# Patient Record
Sex: MALE | Race: WHITE | NOT HISPANIC OR LATINO | Employment: STUDENT | URBAN - METROPOLITAN AREA
[De-identification: names, ages, dates, MRNs, and addresses within clinical notes are randomized per-mention and may not be internally consistent; named-entity substitution may affect disease eponyms.]

---
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
## 2022-06-19 DIAGNOSIS — F32.4 MAJOR DEPRESSIVE DISORDER WITH SINGLE EPISODE, IN PARTIAL REMISSION: ICD-10-CM

## 2022-06-19 NOTE — TELEPHONE ENCOUNTER
No new care gaps identified.  [facility] Embedded Care Gaps. Reference number: [id number]. 6/19/2022   8:31:19 AM CDT

## 2022-06-20 RX ORDER — BUPROPION HYDROCHLORIDE 300 MG/1
TABLET ORAL
Qty: 90 TABLET | Refills: 1 | Status: SHIPPED | OUTPATIENT
Start: 2022-06-20 | End: 2023-01-05

## 2022-06-20 NOTE — TELEPHONE ENCOUNTER
Refill Decision Note   Carmine Rodriguez  is requesting a refill authorization.  Brief Assessment and Rationale for Refill:  Approve     Medication Therapy Plan:       Medication Reconciliation Completed: No   Comments:     No Care Gaps recommended.     Note composed:12:32 PM 06/20/2022

## 2023-01-05 DIAGNOSIS — F32.4 MAJOR DEPRESSIVE DISORDER WITH SINGLE EPISODE, IN PARTIAL REMISSION: ICD-10-CM

## 2023-01-05 RX ORDER — BUPROPION HYDROCHLORIDE 300 MG/1
TABLET ORAL
Qty: 90 TABLET | Refills: 0 | Status: SHIPPED | OUTPATIENT
Start: 2023-01-05 | End: 2023-04-17

## 2023-01-05 NOTE — TELEPHONE ENCOUNTER
Care Due:                  Date            Visit Type   Department     Provider  --------------------------------------------------------------------------------                                EP -                              PRIMARY      [facility] FAMILY  Last Visit: 12-      CARE ([facility])   MEDICINE       Anoop May  Next Visit: None Scheduled  None         None Found                                                            Last  Test          Frequency    Reason                     Performed    Due Date  --------------------------------------------------------------------------------    Office Visit  12 months..  buPROPion................  12- 12-    [facility] Embedded Care Gaps. Reference number: [id number]. 1/05/2023   12:17:46 AM CST

## 2023-01-05 NOTE — TELEPHONE ENCOUNTER
Refill Routing Note   Medication(s) are not appropriate for processing by [facility] for the following reason(s):      - Required vitals are outdated    [facility] action(s):  Defer          Medication reconciliation completed: No     Appointments  past 12m or future 3m with PCP    Date Provider   Last Visit   12/6/2021 Anoop May MD   Next Visit   Visit date not found Anoop May MD   ED visits in past 90 days: 0        Note composed:7:55 AM 01/05/2023

## 2023-01-30 ENCOUNTER — OFFICE VISIT (OUTPATIENT)
Dept: URGENT CARE | Facility: CLINIC | Age: 23
End: 2023-01-30
Payer: COMMERCIAL

## 2023-01-30 VITALS
BODY MASS INDEX: 25.67 KG/M2 | TEMPERATURE: 99 F | OXYGEN SATURATION: 99 % | SYSTOLIC BLOOD PRESSURE: 128 MMHG | DIASTOLIC BLOOD PRESSURE: 87 MMHG | WEIGHT: 200 LBS | HEART RATE: 71 BPM | HEIGHT: 74 IN | RESPIRATION RATE: 18 BRPM

## 2023-01-30 DIAGNOSIS — M54.2 NECK PAIN: Primary | ICD-10-CM

## 2023-01-30 PROCEDURE — 3074F PR MOST RECENT SYSTOLIC BLOOD PRESSURE < 130 MM HG: ICD-10-PCS | Mod: CPTII,S$GLB,, | Performed by: PHYSICIAN ASSISTANT

## 2023-01-30 PROCEDURE — 3008F BODY MASS INDEX DOCD: CPT | Mod: CPTII,S$GLB,, | Performed by: PHYSICIAN ASSISTANT

## 2023-01-30 PROCEDURE — 99213 OFFICE O/P EST LOW 20 MIN: CPT | Mod: S$GLB,,, | Performed by: PHYSICIAN ASSISTANT

## 2023-01-30 PROCEDURE — 70360 X-RAY EXAM OF NECK: CPT | Mod: S$GLB,,, | Performed by: RADIOLOGY

## 2023-01-30 PROCEDURE — 3008F PR BODY MASS INDEX (BMI) DOCUMENTED: ICD-10-PCS | Mod: CPTII,S$GLB,, | Performed by: PHYSICIAN ASSISTANT

## 2023-01-30 PROCEDURE — 3079F PR MOST RECENT DIASTOLIC BLOOD PRESSURE 80-89 MM HG: ICD-10-PCS | Mod: CPTII,S$GLB,, | Performed by: PHYSICIAN ASSISTANT

## 2023-01-30 PROCEDURE — 3079F DIAST BP 80-89 MM HG: CPT | Mod: CPTII,S$GLB,, | Performed by: PHYSICIAN ASSISTANT

## 2023-01-30 PROCEDURE — 1159F MED LIST DOCD IN RCRD: CPT | Mod: CPTII,S$GLB,, | Performed by: PHYSICIAN ASSISTANT

## 2023-01-30 PROCEDURE — 99213 PR OFFICE/OUTPT VISIT, EST, LEVL III, 20-29 MIN: ICD-10-PCS | Mod: S$GLB,,, | Performed by: PHYSICIAN ASSISTANT

## 2023-01-30 PROCEDURE — 1159F PR MEDICATION LIST DOCUMENTED IN MEDICAL RECORD: ICD-10-PCS | Mod: CPTII,S$GLB,, | Performed by: PHYSICIAN ASSISTANT

## 2023-01-30 PROCEDURE — 70360 XR NECK SOFT TISSUE: ICD-10-PCS | Mod: S$GLB,,, | Performed by: RADIOLOGY

## 2023-01-30 PROCEDURE — 3074F SYST BP LT 130 MM HG: CPT | Mod: CPTII,S$GLB,, | Performed by: PHYSICIAN ASSISTANT

## 2023-01-30 PROCEDURE — 1160F PR REVIEW ALL MEDS BY PRESCRIBER/CLIN PHARMACIST DOCUMENTED: ICD-10-PCS | Mod: CPTII,S$GLB,, | Performed by: PHYSICIAN ASSISTANT

## 2023-01-30 PROCEDURE — 1160F RVW MEDS BY RX/DR IN RCRD: CPT | Mod: CPTII,S$GLB,, | Performed by: PHYSICIAN ASSISTANT

## 2023-01-30 RX ORDER — METHYLPHENIDATE HYDROCHLORIDE 54 MG/1
1 TABLET ORAL EVERY MORNING
COMMUNITY

## 2023-01-30 RX ORDER — FLUTICASONE PROPIONATE 50 MCG
1 SPRAY, SUSPENSION (ML) NASAL
COMMUNITY

## 2023-01-30 NOTE — PROGRESS NOTES
"Subjective:       Patient ID: Carmine Rodriguez is a 22 y.o. male.    Vitals:  height is 6' 2" (1.88 m) and weight is 90.7 kg (200 lb). His temperature is 98.5 °F (36.9 °C). His blood pressure is 128/87 and his pulse is 71. His respiration is 18 and oxygen saturation is 99%.     Chief Complaint: lump on neck    Patient is a 22 year old male who presents to clinic with complaint of a lump on the right side of his neck. Reports it has been there  for about a month. Not painful.  Patient states he has some swelling but not too much. No URI symptoms, sore throat, cough. No fever, chills, unexplained weight loss, night sweats. Patient denies swollen or painful lymph nodes assocaited. Patient has not tried anything for the symptoms. He states he does have some upper back muscle tenderness assocaited which he attributed to his posture at his computer. No numbness or tingling. No CP or SOB.     Other  This is a new problem. The current episode started 1 to 4 weeks ago. The problem occurs constantly. The problem has been unchanged. Pertinent negatives include no abdominal pain, anorexia, arthralgias, change in bowel habit, chest pain, chills, congestion, coughing, diaphoresis, fatigue, fever, headaches, joint swelling, myalgias, nausea, neck pain, numbness, rash, sore throat, swollen glands, urinary symptoms, vertigo, visual change, vomiting or weakness. Nothing aggravates the symptoms. He has tried nothing for the symptoms. The treatment provided no relief.     Constitution: Negative for activity change, appetite change, chills, sweating, fatigue, fever, unexpected weight change and generalized weakness.   HENT:  Negative for ear pain, ear discharge, congestion and sore throat.    Neck: Positive for neck swelling. Negative for neck pain, neck stiffness and painful lymph nodes.   Cardiovascular:  Negative for chest pain.   Eyes:  Negative for vision loss.   Respiratory:  Negative for cough.    Gastrointestinal:  Negative for " abdominal pain, nausea and vomiting.   Musculoskeletal:  Negative for joint pain, joint swelling and muscle ache.   Skin:  Negative for rash.   Allergic/Immunologic: Negative for itching.   Neurological:  Negative for history of vertigo, headaches, numbness and tingling.   Hematologic/Lymphatic: Negative for swollen lymph nodes.     Objective:      Physical Exam   Constitutional: He is oriented to person, place, and time. He appears well-developed. He is cooperative.  Non-toxic appearance. He does not appear ill. No distress.   HENT:   Head: Normocephalic and atraumatic.   Ears:   Right Ear: Hearing, tympanic membrane, external ear and ear canal normal.   Left Ear: Hearing, tympanic membrane, external ear and ear canal normal.   Nose: Nose normal. No mucosal edema, rhinorrhea, nasal deformity or congestion. No epistaxis. Right sinus exhibits no maxillary sinus tenderness and no frontal sinus tenderness. Left sinus exhibits no maxillary sinus tenderness and no frontal sinus tenderness.   Mouth/Throat: Uvula is midline, oropharynx is clear and moist and mucous membranes are normal. No trismus in the jaw. Normal dentition. No uvula swelling. No oropharyngeal exudate, posterior oropharyngeal edema or posterior oropharyngeal erythema.   Eyes: Conjunctivae and lids are normal. No scleral icterus.   Neck: Trachea normal and phonation normal. Neck supple. Carotid bruit is not present. No edema present. No erythema present. No neck rigidity present.   Cardiovascular: Normal rate, regular rhythm, normal heart sounds and normal pulses.   Pulmonary/Chest: Effort normal and breath sounds normal. No respiratory distress. He has no decreased breath sounds. He has no rhonchi.   Abdominal: Normal appearance.   Musculoskeletal: Normal range of motion.         General: No deformity. Normal range of motion.      Cervical back: He exhibits no tenderness.   Lymphadenopathy:        Head (right side): No submental, no submandibular and no  posterior auricular adenopathy present.        Head (left side): No submental, no submandibular and no posterior auricular adenopathy present.     He has no cervical adenopathy.        Right cervical: No superficial cervical, no deep cervical and no posterior cervical adenopathy present.       Left cervical: No superficial cervical, no deep cervical and no posterior cervical adenopathy present.   Right upper body: No axillary adenopathy present.   Left upper body: No axillary adenopathy present.       Right: No supraclavicular and no epitrochlear adenopathy present.        Left: No supraclavicular and no epitrochlear adenopathy present.   Neurological: He is alert and oriented to person, place, and time. He exhibits normal muscle tone. Coordination normal.   Skin: Skin is warm, dry, intact, not diaphoretic and not pale.   Psychiatric: His speech is normal and behavior is normal. Judgment and thought content normal.   Nursing note and vitals reviewed.      XR NECK SOFT TISSUE    Result Date: 1/30/2023  EXAM: XR NECK SOFT TISSUE Lateral view of the neck CLINICAL HISTORY:  RFS#[[id number]]-Cervicalgia. FINDINGS: No radiopaque foreign body identified. Vertebral body height and alignment are maintained.  The disc spaces are normal in height.  No acute fracture is evident.  No prevertebral soft tissue swelling.     No acute radiographic abnormality on single lateral view of the neck. Finalized on: 1/30/2023 4:19 PM By:  Jean-Paul Barajas MD [facility]# [id number]      2023-01-30 16:21:28.723    [facility]       Assessment:       1. Neck pain          Plan:         Neck pain  -     XR NECK SOFT TISSUE; Future; Expected date: 01/30/2023    Discussed on my read no abnormality of soft tissue neck x-ray. No edema noted. Discussed likely muscular pain and use of oral ibuprofen 3 times daily for 5 days to treat inflammation. Discussed follow up with pcp in 1-2 weeks for further care if no improvement. Wendyetn verbalized understanding and agrees with  plan.     Juliana Han PA-C    Patient Instructions   General Discharge Instructions   If you were prescribed a narcotic or controlled medication, do not drive or operate heavy equipment or machinery while taking these medications.  If you were prescribed antibiotics, please take them to completion.  You must understand that you've received an Urgent Care treatment only and that you may be released before all your medical problems are known or treated. You, the patient, will arrange for follow up care as instructed.  Follow up with your PCP or specialty clinic as directed in the next 1-2 weeks if not improved or as needed.  You can call [phone] to schedule an appointment with the appropriate provider.  If your condition worsens we recommend that you receive another evaluation at the emergency room immediately or contact your primary medical clinics after hours call service to discuss your concerns.  Please return here or go to the Emergency Department for any concerns or worsening of condition.

## 2023-01-30 NOTE — LETTER
January 30, 2023      [facility]  [address] [place] [address] [place] [address]  Phone: [phone]  Fax: [phone]       Patient: Carmine Rodriguez   YOB: 2000  Date of Visit: 01/30/2023    To Whom It May Concern:    Gio Rodriguez  was at [facility] on 01/30/2023. The patient may return to work/school on 1/31/23 with no restrictions. If you have any questions or concerns, or if I can be of further assistance, please do not hesitate to contact me.    Sincerely,    Juliana Han PA-C

## 2023-01-30 NOTE — PATIENT INSTRUCTIONS

## 2023-02-08 ENCOUNTER — OFFICE VISIT (OUTPATIENT)
Dept: URGENT CARE | Facility: CLINIC | Age: 23
End: 2023-02-08
Payer: COMMERCIAL

## 2023-02-08 VITALS
RESPIRATION RATE: 16 BRPM | HEART RATE: 90 BPM | HEIGHT: 74 IN | TEMPERATURE: 99 F | OXYGEN SATURATION: 96 % | DIASTOLIC BLOOD PRESSURE: 71 MMHG | BODY MASS INDEX: 25.67 KG/M2 | SYSTOLIC BLOOD PRESSURE: 119 MMHG | WEIGHT: 200 LBS

## 2023-02-08 DIAGNOSIS — R09.81 CONGESTION OF NASAL SINUS: ICD-10-CM

## 2023-02-08 DIAGNOSIS — R53.83 FATIGUE, UNSPECIFIED TYPE: ICD-10-CM

## 2023-02-08 DIAGNOSIS — J06.9 URI WITH COUGH AND CONGESTION: Primary | ICD-10-CM

## 2023-02-08 DIAGNOSIS — J02.9 ACUTE PHARYNGITIS, UNSPECIFIED ETIOLOGY: ICD-10-CM

## 2023-02-08 LAB
CTP QC/QA: YES
SARS-COV-2 AG RESP QL IA.RAPID: NEGATIVE

## 2023-02-08 PROCEDURE — 1160F PR REVIEW ALL MEDS BY PRESCRIBER/CLIN PHARMACIST DOCUMENTED: ICD-10-PCS | Mod: CPTII,S$GLB,, | Performed by: NURSE PRACTITIONER

## 2023-02-08 PROCEDURE — 3078F PR MOST RECENT DIASTOLIC BLOOD PRESSURE < 80 MM HG: ICD-10-PCS | Mod: CPTII,S$GLB,, | Performed by: NURSE PRACTITIONER

## 2023-02-08 PROCEDURE — 99213 OFFICE O/P EST LOW 20 MIN: CPT | Mod: S$GLB,,, | Performed by: NURSE PRACTITIONER

## 2023-02-08 PROCEDURE — 87811 SARS-COV-2 COVID19 W/OPTIC: CPT | Mod: QW,S$GLB,, | Performed by: NURSE PRACTITIONER

## 2023-02-08 PROCEDURE — 1159F PR MEDICATION LIST DOCUMENTED IN MEDICAL RECORD: ICD-10-PCS | Mod: CPTII,S$GLB,, | Performed by: NURSE PRACTITIONER

## 2023-02-08 PROCEDURE — 3074F SYST BP LT 130 MM HG: CPT | Mod: CPTII,S$GLB,, | Performed by: NURSE PRACTITIONER

## 2023-02-08 PROCEDURE — 1160F RVW MEDS BY RX/DR IN RCRD: CPT | Mod: CPTII,S$GLB,, | Performed by: NURSE PRACTITIONER

## 2023-02-08 PROCEDURE — 3078F DIAST BP <80 MM HG: CPT | Mod: CPTII,S$GLB,, | Performed by: NURSE PRACTITIONER

## 2023-02-08 PROCEDURE — 1159F MED LIST DOCD IN RCRD: CPT | Mod: CPTII,S$GLB,, | Performed by: NURSE PRACTITIONER

## 2023-02-08 PROCEDURE — 99213 PR OFFICE/OUTPT VISIT, EST, LEVL III, 20-29 MIN: ICD-10-PCS | Mod: S$GLB,,, | Performed by: NURSE PRACTITIONER

## 2023-02-08 PROCEDURE — 3074F PR MOST RECENT SYSTOLIC BLOOD PRESSURE < 130 MM HG: ICD-10-PCS | Mod: CPTII,S$GLB,, | Performed by: NURSE PRACTITIONER

## 2023-02-08 PROCEDURE — 3008F PR BODY MASS INDEX (BMI) DOCUMENTED: ICD-10-PCS | Mod: CPTII,S$GLB,, | Performed by: NURSE PRACTITIONER

## 2023-02-08 PROCEDURE — 87811 SARS CORONAVIRUS 2 ANTIGEN POCT, MANUAL READ: ICD-10-PCS | Mod: QW,S$GLB,, | Performed by: NURSE PRACTITIONER

## 2023-02-08 PROCEDURE — 3008F BODY MASS INDEX DOCD: CPT | Mod: CPTII,S$GLB,, | Performed by: NURSE PRACTITIONER

## 2023-02-08 RX ORDER — DOXYLAMINE SUCCINATE AND PHENYLEPHRINE HYDROCHLORIDE 10.5; 1 MG/1; MG/1
1 TABLET ORAL 2 TIMES DAILY PRN
Qty: 10 TABLET | Refills: 0 | Status: SHIPPED | OUTPATIENT
Start: 2023-02-08

## 2023-02-08 NOTE — LETTER
February 8, 2023      [facility]  [address] [place] [address] [place] [address]  Phone: [phone]  Fax: [phone]       Patient: Carmine Rodriguez   YOB: 2000  Date of Visit: 02/08/2023    To Whom It May Concern:    Gio Rodriguez  was at [facility] on 02/08/2023. The patient may return to work/school on 02/09/2023 with no restrictions. If you have any questions or concerns, or if I can be of further assistance, please do not hesitate to contact me.    Sincerely,    Radames Mares NP

## 2023-02-08 NOTE — PATIENT INSTRUCTIONS
Please follow up with your Primary care provider within 2-5 days if your signs and symptoms have not resolved or worsen.     If your condition worsens or fails to improve we recommend that you receive another evaluation at the emergency room immediately or contact your primary medical clinic to discuss your concerns.   You must understand that you have received an Urgent Care treatment only and that you may be released before all of your medical problems are known or treated. You, the patient, will arrange for follow up care as instructed.     RED FLAGS/WARNING SYMPTOMS DISCUSSED WITH PATIENT THAT WOULD WARRANT EMERGENT MEDICAL ATTENTION. PATIENT VERBALIZED UNDERSTANDING.    
100

## 2023-02-08 NOTE — PROGRESS NOTES
"Subjective:       Patient ID: Carmine Rodriguez is a 22 y.o. male.    Vitals:  height is 6' 2" (1.88 m) and weight is 90.7 kg (200 lb). His temperature is 99.1 °F (37.3 °C). His blood pressure is 119/71 and his pulse is 90. His respiration is 16 and oxygen saturation is 96%.     Chief Complaint: Nasal Congestion    Pt present for sore throat that started 3 days ago. Pt states he coughs up mucus and has congestion.    Sunday he woke up with pains in his throat for 3 days   Rates sore throat 3-4/10 on numeric scale  States on this morning he woke up with chills, body aces, fatigue, HA, and loss of appetite  States he started coughing on yesterday  States he GF has had bronchitis for about 1 weeks    Other  This is a new problem. Episode onset: 3 days ago. The problem has been gradually worsening. Associated symptoms include anorexia, chills, congestion, coughing, fatigue, headaches, myalgias, a sore throat and swollen glands. Pertinent negatives include no abdominal pain, arthralgias, change in bowel habit, chest pain, diaphoresis, fever, joint swelling, nausea, neck pain, numbness, rash, urinary symptoms, visual change, vomiting or weakness. The symptoms are aggravated by drinking and eating. He has tried NSAIDs for the symptoms. The treatment provided mild relief.     Constitution: Positive for chills and fatigue. Negative for sweating and fever.   HENT:  Positive for congestion and sore throat.    Neck: Negative for neck pain.   Cardiovascular:  Negative for chest pain.   Respiratory:  Positive for cough. Negative for shortness of breath and wheezing.    Gastrointestinal:  Negative for abdominal pain, nausea and vomiting.   Musculoskeletal:  Positive for muscle ache. Negative for joint pain and joint swelling.   Skin:  Negative for rash.   Neurological:  Positive for headaches. Negative for numbness.     Objective:      Physical Exam   Constitutional: He appears well-developed. He is cooperative.  Non-toxic " appearance. He does not appear ill. No distress. awake  HENT:   Head: Normocephalic and atraumatic.   Ears:   Right Ear: Tympanic membrane, external ear and ear canal normal.   Left Ear: Tympanic membrane, external ear and ear canal normal.   Nose: Congestion present. No mucosal edema, rhinorrhea or purulent discharge.   Mouth/Throat: No oropharyngeal exudate or posterior oropharyngeal erythema.   Eyes: Conjunctivae and EOM are normal.   Neck: Neck supple.   Cardiovascular: Normal rate and regular rhythm.   Pulmonary/Chest: Effort normal. No stridor. No respiratory distress. He has no wheezes. He has no rhonchi. He has no rales.   Abdominal: Normal appearance.   Musculoskeletal: Normal range of motion.         General: Normal range of motion.   Neurological: no focal deficit. He is alert. He displays no weakness. Gait normal.   Skin: Skin is warm, dry, not diaphoretic, not pale and no rash.   Psychiatric: His behavior is normal.   Nursing note and vitals reviewed.    Results for orders placed or performed in visit on 02/08/23   SARS Coronavirus 2 Antigen, POCT Manual Read   Result Value Ref Range    SARS Coronavirus 2 Antigen Negative Negative     Acceptable Yes          Assessment:       1. URI with cough and congestion    2. Acute pharyngitis, unspecified etiology    3. Congestion of nasal sinus    4. Fatigue, unspecified type          Plan:         URI with cough and congestion  -     doxylamine-phenylephrine (POLY HIST FORTE) 10.5-10 mg Tab; Take 1 tablet by mouth 2 (two) times daily as needed.  Dispense: 10 tablet; Refill: 0    Acute pharyngitis, unspecified etiology    Congestion of nasal sinus  -     SARS Coronavirus 2 Antigen, POCT Manual Read    Fatigue, unspecified type            Discussed results with patient and possibly testing too early for POCT  Discussed use of OTC medications for symptom control as this is a viral illness   Discussed OOB as much as possible, Tylenol only for fever,  pain, and body aches, and to increase hydration  All ER precautions covered including but not limited to shortness of breath, difficulty breathing, intractable fever, or chest pain.  Discussed RTC or follow up with PCP if symptoms worsen, change, or persist.   Patient verbalized understanding and agreed with the plan of care.   LYNDSAY Mares NP     Patient Instructions   Please follow up with your Primary care provider within 2-5 days if your signs and symptoms have not resolved or worsen.     If your condition worsens or fails to improve we recommend that you receive another evaluation at the emergency room immediately or contact your primary medical clinic to discuss your concerns.   You must understand that you have received an Urgent Care treatment only and that you may be released before all of your medical problems are known or treated. You, the patient, will arrange for follow up care as instructed.     RED FLAGS/WARNING SYMPTOMS DISCUSSED WITH PATIENT THAT WOULD WARRANT EMERGENT MEDICAL ATTENTION. PATIENT VERBALIZED UNDERSTANDING.

## 2023-07-31 ENCOUNTER — OFFICE VISIT (OUTPATIENT)
Dept: URGENT CARE | Facility: CLINIC | Age: 23
End: 2023-07-31
Payer: COMMERCIAL

## 2023-07-31 VITALS
WEIGHT: 208 LBS | BODY MASS INDEX: 26.69 KG/M2 | OXYGEN SATURATION: 97 % | SYSTOLIC BLOOD PRESSURE: 130 MMHG | HEIGHT: 74 IN | TEMPERATURE: 98 F | RESPIRATION RATE: 18 BRPM | HEART RATE: 71 BPM | DIASTOLIC BLOOD PRESSURE: 77 MMHG

## 2023-07-31 DIAGNOSIS — R05.9 COUGH, UNSPECIFIED TYPE: ICD-10-CM

## 2023-07-31 DIAGNOSIS — U07.1 COVID-19: Primary | ICD-10-CM

## 2023-07-31 LAB
CTP QC/QA: YES
SARS-COV-2 AG RESP QL IA.RAPID: POSITIVE

## 2023-07-31 PROCEDURE — 99214 PR OFFICE/OUTPT VISIT, EST, LEVL IV, 30-39 MIN: ICD-10-PCS | Mod: S$GLB,,,

## 2023-07-31 PROCEDURE — 87811 SARS CORONAVIRUS 2 ANTIGEN POCT, MANUAL READ: ICD-10-PCS | Mod: QW,S$GLB,,

## 2023-07-31 PROCEDURE — 87811 SARS-COV-2 COVID19 W/OPTIC: CPT | Mod: QW,S$GLB,,

## 2023-07-31 PROCEDURE — 99214 OFFICE O/P EST MOD 30 MIN: CPT | Mod: S$GLB,,,

## 2023-07-31 NOTE — LETTER
"Carmine Joseph"Michael was seen and treated in our Urgent Care on 7/31/2023.     Carmine Rodriguez has met the criteria for COVID-19 testing and has a POSITIVE result. He can return to work once they are asymptomatic for 24 hours without the use of fever reducing medications AND at least five days from the first positive result. A mask is recommended for 5 days post quarantine.     If you have any questions or concerns, or if I can be of further assistance, please do not hesitate to contact me.    Sincerely,     Deonna Jin PA-C  "

## 2023-07-31 NOTE — PATIENT INSTRUCTIONS
Your test was POSITIVE for COVID-19 (coronavirus).       Please isolate yourself at home.  You may leave home and/or return to work once the following conditions are met:    If you were not hospitalized and are not moderately to severely immunocompromised:   More than 5 days since symptoms first appeared AND  More than 24 hours fever free without medications AND  Symptoms are improving  Continue to wear a mask around others for 5 additional days.    If you were hospitalized OR are moderately to severely immunocompromised:  More than 20 days since symptoms first appeared  More than 24 hours fever free without medications  Symptoms have improved    If you had no symptoms but tested positive:  More than 5 days since the date of the first positive test (20 days if moderately to severely immunocompromised). If you develop symptoms, then use the guidelines above.  Continue to wear a mask around others for 5 additional days.      Contact Tracing    As one of the next steps, you will receive a call or text from the [place] Department of Health ([facility]) COVID-19 Tracing Team. See the contact information below so you know not to ignore the health departments call. It is important that you contact them back immediately so they can help.      Contact Tracer Number:  [phone]  Caller ID for most carriers: [facility]     What is contact tracing?  Contact tracing is a process that helps identify everyone who has been in close contact with an infected person. Contact tracers let those people know they may have been exposed and guide them on next steps. Confidentiality is important for everyone; no one will be told who may have exposed them to the virus.  Your involvement is important. The more we know about where and how this virus is spreading, the better chance we have at stopping it from spreading further.  What does exposure mean?  Exposure means you have been within 6 feet for more than 15 minutes with a person who  has or had COVID-19.  What kind of questions do the contact tracers ask?  A contact tracer will confirm your basic contact information including name, address, phone number, and next of kin, as well as asking about any symptoms you may have had. Theyll also ask you how you think you may have gotten sick, such as places where you may have been exposed to the virus, and people you were with. Those names will never be shared with anyone outside of that call, and will only be used to help trace and stop the spread of the virus.   I have privacy concerns. How will the state use my information?  Your privacy about your health is important. All calls are completed using call centers that use the appropriate health privacy protection measures (HIPAA compliance), meaning that your patient information is safe. No one will ever ask you any questions related to immigration status. Your health comes first.   Do I have to participate?  You do not have to participate, but we strongly encourage you to. Contact tracing can help us catch and control new outbreaks as theyre developing to keep your friends and family safe.   What if I dont hear from anyone?  If you dont receive a call within 24 hours, you can call the number above right away to inquire about your status. That line is open from 8:00 am - 8:00 p.m., 7 days a week.  Contact tracing saves lives! Together, we have the power to beat this virus and keep our loved ones and neighbors safe.    For more information see CDC link below.      https://www.cdc.gov/coronavirus/2019-ncov/hcp/guidance-prevent-spread.html#precautions        Sources:  [facility]         PLEASE READ YOUR DISCHARGE INSTRUCTIONS ENTIRELY AS IT CONTAINS IMPORTANT INFORMATION.    Please drink plenty of fluids.    Please get plenty of rest.    Please return here or go to the Emergency Department for any concerns or worsening of condition.    Please take an over the  counter antihistamine medication (Allegra/Claritin/Zyrtec) of your choice as directed for allergy symptoms and/or runny nose and postnasal drip.    Try an over the counter decongestant for sinus pressure/ear pressure, congestion symptoms like Mucinex D or Sudafed or Phenylephrine. You buy this behind the pharmacy counter.    If you do have Hypertension or palpitations, it is safe to take Coricidin HBP for relief of sinus symptoms.    Tylenol or ibuprofen can also be used as directed for pain and fever unless you have an allergy to them or medical condition such as stomach ulcers, kidney or liver disease or blood thinners etc for which you should not be taking these type of medications.     Sore throat recommendations: Warm fluids, warm salt water gargles, throat lozenges, tea, honey, soup, rest, hydration.    Use over the counter Flonase or Nasocort: one spray each nostril twice daily OR two sprays each nostril once daily until nares dry out, unless you have Glaucoma.   Can also supplement with nasal saline rinse.    Sinus rinses DO NOT USE TAP WATER, if you must, water must be at a rolling boil for 1 minute, let it cool, then use.  May use distilled water, or over the counter nasal saline rinses.  Erasto's vapor rub in shower to help open nasal passages.  May use nasal gel to keep passages moisturized.  May use nasal saline sprays during the day for added relief of congestion.   For those who go to the gym, please do not use the sauna or steam room now to clear sinuses.    Cough     Rest and fluids are important  Can use honey with gary to soothe your throat    Robitussin or Delsyum for cough suppressant for dry cough.    Mucinex DM or products containing Guaifenesin or Dextromethorphan for expectorant (wet cough).    Take prescription cough meds (pills) as prescribed; take prescription cough syrup at night as needed for cough.  Do not take both the prescribed cough pills and syrup at the same time or within 6  hours of each other.  Do not take the cough syrup with any other sedative medication as it can can cause drowsiness. Do not operate any heavy machinery, drink or drive while taking the cough syrup.     Please follow up with your primary care doctor or specialist in the next 48-72hrs as needed and if no improvement    If you smoke, please stop smoking.    Please return or see your primary care doctor if you develop new or worsening symptoms.     Lastly, good hand washing and cough hygiene (cough into your elbow) will help prevent the spread of the illness. A general rule is that you are no longer contagious once you have been without a fever for over 24 hours without requiring fever reducing medications.     Please arrange follow up with your primary medical clinic as soon as possible. You must understand that you've received an Urgent Care treatment only and that you may be released before all of your medical problems are known or treated. You, the patient, will arrange for follow up as instructed. If your symptoms worsen or fail to improve you should go to the Emergency Room.

## 2023-07-31 NOTE — PROGRESS NOTES
"Subjective:      Patient ID: Carmine Rodriguez is a 23 y.o. male.    Vitals:  height is 6' 2" (1.88 m) and weight is 94.3 kg (208 lb). His oral temperature is 98.2 °F (36.8 °C). His blood pressure is 130/77 and his pulse is 71. His respiration is 18 and oxygen saturation is 97%.     Chief Complaint: Fever    Carmine Rodriguez is a 23 y.o. male who presents for fever (Tmax 101 F) which onset 3 days ago. Associated sxs include cough, HA, sore throat, congestion, and generalized myalgias. Patient denies any chills, SOB, CP, abd pain, n/v/d, rash, dizziness, or numbness/tingling. Prior Tx includes Ibuprofen.    Fever   This is a new problem. The current episode started in the past 7 days. The problem occurs constantly. The problem has been unchanged. The maximum temperature noted was 100 to 100.9 F. The temperature was taken using an axillary reading. Associated symptoms include congestion, coughing, headaches, muscle aches and a sore throat. Pertinent negatives include no abdominal pain, chest pain, diarrhea, ear pain, nausea, rash, sleepiness, urinary pain, vomiting or wheezing. He has tried NSAIDs for the symptoms. The treatment provided mild relief.   Risk factors: no contaminated food, no contaminated water, no hx of cancer, no immunosuppression, no occupational exposure, no recent sickness, no recent travel and no sick contacts        Constitution: Positive for fever. Negative for appetite change, chills, sweating and fatigue.   HENT:  Positive for congestion and sore throat. Negative for ear pain, ear discharge, foreign body in ear, hearing loss, postnasal drip, sinus pain, sinus pressure and trouble swallowing.    Cardiovascular:  Negative for chest pain.   Respiratory:  Positive for cough. Negative for sputum production, shortness of breath and wheezing.    Gastrointestinal:  Negative for abdominal pain, nausea, vomiting and diarrhea.   Genitourinary:  Negative for dysuria.   Musculoskeletal:  Negative for muscle " ache.   Skin:  Negative for rash.   Neurological:  Positive for headaches. Negative for dizziness, numbness and tingling.      Objective:     Physical Exam   Constitutional: He is oriented to person, place, and time. He appears well-developed. He is cooperative.  Non-toxic appearance. He does not appear ill. No distress.   HENT:   Head: Normocephalic and atraumatic.   Ears:   Right Ear: Hearing, tympanic membrane, external ear and ear canal normal.   Left Ear: Hearing, tympanic membrane, external ear and ear canal normal.   Nose: Nose normal. No mucosal edema or nasal deformity. No epistaxis. Right sinus exhibits no maxillary sinus tenderness and no frontal sinus tenderness. Left sinus exhibits no maxillary sinus tenderness and no frontal sinus tenderness.   Mouth/Throat: Uvula is midline, oropharynx is clear and moist and mucous membranes are normal. Mucous membranes are moist. No trismus in the jaw. Normal dentition. No uvula swelling. No oropharyngeal exudate, posterior oropharyngeal edema or posterior oropharyngeal erythema.   Eyes: Conjunctivae and lids are normal. No scleral icterus. Extraocular movement intact   Neck: Trachea normal and phonation normal. Neck supple. No edema present. No erythema present. No neck rigidity present.   Cardiovascular: Normal rate, regular rhythm, normal heart sounds and normal pulses.   Pulmonary/Chest: Effort normal and breath sounds normal. No stridor. No respiratory distress. He has no decreased breath sounds. He has no wheezes. He has no rhonchi. He has no rales.   Abdominal: Normal appearance.   Musculoskeletal: Normal range of motion.         General: No deformity. Normal range of motion.   Neurological: He is alert and oriented to person, place, and time. He exhibits normal muscle tone. Coordination normal.   Skin: Skin is warm, dry, intact, not diaphoretic and not pale.   Psychiatric: His speech is normal and behavior is normal. Judgment and thought content normal.    Nursing note and vitals reviewed.      Assessment:     1. COVID-19    2. Cough, unspecified type        Results for orders placed or performed in visit on 07/31/23   SARS Coronavirus 2 Antigen, POCT Manual Read   Result Value Ref Range    SARS Coronavirus 2 Antigen Positive (A) Negative     Acceptable Yes        Plan:       COVID-19    Cough, unspecified type  -     SARS Coronavirus 2 Antigen, POCT Manual Read      VSS. Afebrile. Patient is in NAD.  COVID-19 Risk Score = 1  Chart reviewed.  Isolation precautions given. Discussed 5 day isolation period from first (+) test. Patient verbalizes understanding.    Discussed risk and benefits of Paxlovid therapy. Paxlovid offered. Patient would like to declines antiviral therapy. Educated patient on drug interactions and discussed discontinuation/alterations of necessary medications.     Discussed positive COVID-19 results with patient. Get plenty of rest and drink plenty of fluids. Advised him to return here or go to the Emergency Department for any concerns or worsening of condition. Advised patient to take tylenol (acetominophen) as permitted for fever, chills or body aches every 4 hours but not to exceed 4000 mg/day. Patient has no questions or concerns at this time, all questions were answered before discharge. Patient given handout with discharge instructions.  ER precautions given such as persistent fever, SOB, or CP.

## 2023-12-01 ENCOUNTER — OFFICE VISIT (OUTPATIENT)
Dept: URGENT CARE | Facility: CLINIC | Age: 23
End: 2023-12-01
Payer: COMMERCIAL

## 2023-12-01 VITALS
WEIGHT: 215 LBS | TEMPERATURE: 99 F | SYSTOLIC BLOOD PRESSURE: 130 MMHG | BODY MASS INDEX: 27.59 KG/M2 | HEIGHT: 74 IN | RESPIRATION RATE: 18 BRPM | HEART RATE: 87 BPM | DIASTOLIC BLOOD PRESSURE: 72 MMHG | OXYGEN SATURATION: 97 %

## 2023-12-01 DIAGNOSIS — J06.9 VIRAL URI WITH COUGH: Primary | ICD-10-CM

## 2023-12-01 DIAGNOSIS — R09.81 SINUS CONGESTION: ICD-10-CM

## 2023-12-01 DIAGNOSIS — J34.89 SINUS PRESSURE: ICD-10-CM

## 2023-12-01 DIAGNOSIS — R09.82 POST-NASAL DRIP: ICD-10-CM

## 2023-12-01 LAB
CTP QC/QA: YES
SARS-COV-2 AG RESP QL IA.RAPID: NEGATIVE

## 2023-12-01 PROCEDURE — 99214 OFFICE O/P EST MOD 30 MIN: CPT | Mod: S$GLB,,, | Performed by: NURSE PRACTITIONER

## 2023-12-01 PROCEDURE — 87811 SARS CORONAVIRUS 2 ANTIGEN POCT, MANUAL READ: ICD-10-PCS | Mod: QW,S$GLB,, | Performed by: NURSE PRACTITIONER

## 2023-12-01 PROCEDURE — 99214 PR OFFICE/OUTPT VISIT, EST, LEVL IV, 30-39 MIN: ICD-10-PCS | Mod: S$GLB,,, | Performed by: NURSE PRACTITIONER

## 2023-12-01 PROCEDURE — 87811 SARS-COV-2 COVID19 W/OPTIC: CPT | Mod: QW,S$GLB,, | Performed by: NURSE PRACTITIONER

## 2023-12-01 RX ORDER — IPRATROPIUM BROMIDE 21 UG/1
2 SPRAY, METERED NASAL 3 TIMES DAILY
Qty: 30 ML | Refills: 0 | Status: SHIPPED | OUTPATIENT
Start: 2023-12-01 | End: 2023-12-08

## 2023-12-01 NOTE — PATIENT INSTRUCTIONS
Rest  Hydration/increase fluids  Steam/hot showers  Claritin D OTC as directed for nasal congestion/sinus pressure  Ipratropium bromide as directed for nasal congestion  Typical course and duration of illness discussed  Signs and symptoms of worsening discussed  Follow up as needed/with worsening

## 2023-12-01 NOTE — PROGRESS NOTES
"Subjective:      Patient ID: Carmine Rodriguez is a 23 y.o. male.    Vitals:  height is 6' 2" (1.88 m) and weight is 97.5 kg (215 lb). His oral temperature is 98.7 °F (37.1 °C). His blood pressure is 130/72 and his pulse is 87. His respiration is 18 and oxygen saturation is 97%.     Chief Complaint: Sinus Problem (Pt stated green mucus production, sinus pressure, nasal congestion x 1 week.)    23 year old male presents for evaluation of sinus pressure, nasal congestion x 1 week. OTC antihistamine, Mucinex D, Neti Pot and Flonase without relief. No known sick contacts.     Sinus Problem  This is a new problem. The current episode started 1 to 4 weeks ago. The problem has been gradually worsening since onset. There has been no fever. His pain is at a severity of 0/10. He is experiencing no pain. Associated symptoms include congestion, coughing, sinus pressure and sneezing. Pertinent negatives include no chills, diaphoresis, ear pain, headaches, shortness of breath or sore throat. Past treatments include oral decongestants, nasal decongestants and saline nose sprays. The treatment provided no relief.       Constitution: Positive for appetite change and fatigue. Negative for chills, sweating and fever.   HENT:  Positive for congestion, postnasal drip and sinus pressure. Negative for ear pain and sore throat.    Neck: neck negative.   Cardiovascular: Negative.    Eyes: Negative.    Respiratory:  Positive for cough and sputum production. Negative for shortness of breath and wheezing.    Gastrointestinal:  Positive for diarrhea (x 1 day). Negative for nausea and vomiting.   Endocrine: negative.   Genitourinary: Negative.    Musculoskeletal: Negative.  Negative for muscle ache.   Skin: Negative.    Allergic/Immunologic: Positive for sneezing.   Neurological: Negative.  Negative for headaches.   Hematologic/Lymphatic: Negative.    Psychiatric/Behavioral: Negative.        Objective:     Physical Exam   Constitutional: He is " oriented to person, place, and time. He appears well-developed. He is cooperative.  Non-toxic appearance. He does not appear ill. No distress. awake  HENT:   Head: Normocephalic and atraumatic.   Ears:   Right Ear: Hearing, tympanic membrane, external ear and ear canal normal. No cerumen not present. Tympanic membrane is not injected, not scarred, not perforated, not erythematous, not retracted and not bulging. impacted cerumen  Left Ear: Hearing, tympanic membrane, external ear and ear canal normal. No cerumen not present. Tympanic membrane is not injected, not scarred, not perforated, not erythematous, not retracted and not bulging. impacted cerumen  Nose: Mucosal edema and congestion present. No rhinorrhea or nasal deformity. No epistaxis. Right sinus exhibits no maxillary sinus tenderness and no frontal sinus tenderness. Left sinus exhibits no maxillary sinus tenderness and no frontal sinus tenderness.   Mouth/Throat: Uvula is midline and mucous membranes are normal. Mucous membranes are moist. No trismus in the jaw. Normal dentition. No uvula swelling. Cobblestoning present. No oropharyngeal exudate, posterior oropharyngeal edema, posterior oropharyngeal erythema or tonsillar abscesses.   Eyes: Conjunctivae and lids are normal. Pupils are equal, round, and reactive to light. Right eye exhibits no discharge. Left eye exhibits no discharge. No scleral icterus. Extraocular movement intact   Neck: Trachea normal and phonation normal. Neck supple. No edema present. No erythema present. No neck rigidity present.   Cardiovascular: Normal rate, regular rhythm, normal heart sounds and normal pulses.   Pulmonary/Chest: Effort normal and breath sounds normal. No stridor. No respiratory distress. He has no decreased breath sounds. He has no wheezes. He has no rhonchi. He has no rales. He exhibits no tenderness.   Abdominal: Normal appearance.   Musculoskeletal: Normal range of motion.         General: No deformity. Normal  range of motion.   Neurological: no focal deficit. He is alert and oriented to person, place, and time. He exhibits normal muscle tone. Coordination normal.   Skin: Skin is warm, dry, intact, not diaphoretic and not pale.   Psychiatric: His speech is normal and behavior is normal. Mood, judgment and thought content normal.   Nursing note and vitals reviewed.    Results for orders placed or performed in visit on 12/01/23   SARS Coronavirus 2 Antigen, POCT Manual Read   Result Value Ref Range    SARS Coronavirus 2 Antigen Negative Negative     Acceptable Yes        Assessment:     1. Viral URI with cough    2. Sinus congestion    3. Sinus pressure    4. Post-nasal drip        Plan:     Patient presents with symptoms that are consistent with viral illness. Decision to perform Covid swab to rule out infection, (-) result discussed. Exam negative for sinusitis/bacterial tonsillitis/otitis media/bronchitis/pneumonia. Plan is to manage symptoms and prevent worsening. Discussed with patient who verbalizes understanding.       Viral URI with cough    Sinus congestion  -     SARS Coronavirus 2 Antigen, POCT Manual Read  -     ipratropium (ATROVENT) 21 mcg (0.03 %) nasal spray; 2 sprays by Each Nostril route 3 (three) times daily. for 7 days  Dispense: 30 mL; Refill: 0    Sinus pressure    Post-nasal drip                Patient Instructions   Rest  Hydration/increase fluids  Steam/hot showers  Claritin D OTC as directed for nasal congestion/sinus pressure  Ipratropium bromide as directed for nasal congestion  Typical course and duration of illness discussed  Signs and symptoms of worsening discussed  Follow up as needed/with worsening

## 2024-05-28 ENCOUNTER — TELEPHONE (OUTPATIENT)
Dept: PULMONOLOGY | Facility: CLINIC | Age: 24
End: 2024-05-28
Payer: COMMERCIAL

## 2024-05-28 DIAGNOSIS — G47.30 SLEEP-DISORDERED BREATHING: Primary | ICD-10-CM

## 2024-05-28 PROBLEM — G47.33 OSA (OBSTRUCTIVE SLEEP APNEA): Status: ACTIVE | Noted: 2024-05-28

## 2024-11-08 ENCOUNTER — PATIENT MESSAGE (OUTPATIENT)
Dept: PSYCHIATRY | Facility: CLINIC | Age: 24
End: 2024-11-08
Payer: COMMERCIAL

## 2024-11-11 ENCOUNTER — OFFICE VISIT (OUTPATIENT)
Dept: PSYCHIATRY | Facility: CLINIC | Age: 24
End: 2024-11-11
Payer: COMMERCIAL

## 2024-11-11 DIAGNOSIS — F32.1 MAJOR DEPRESSIVE DISORDER, SINGLE EPISODE, MODERATE: Primary | ICD-10-CM

## 2024-11-11 PROCEDURE — 90791 PSYCH DIAGNOSTIC EVALUATION: CPT | Mod: 95,,, | Performed by: SOCIAL WORKER
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
## 2024-11-21 ENCOUNTER — OFFICE VISIT (OUTPATIENT)
Dept: PRIMARY CARE CLINIC | Facility: CLINIC | Age: 24
End: 2024-11-21
Payer: COMMERCIAL

## 2024-11-21 VITALS
SYSTOLIC BLOOD PRESSURE: 120 MMHG | DIASTOLIC BLOOD PRESSURE: 78 MMHG | WEIGHT: 231.44 LBS | BODY MASS INDEX: 29.71 KG/M2 | HEART RATE: 96 BPM | OXYGEN SATURATION: 98 %

## 2024-11-21 DIAGNOSIS — Z76.89 ESTABLISHING CARE WITH NEW DOCTOR, ENCOUNTER FOR: Primary | ICD-10-CM

## 2024-11-21 DIAGNOSIS — F32.4 MAJOR DEPRESSIVE DISORDER WITH SINGLE EPISODE, IN PARTIAL REMISSION: ICD-10-CM

## 2024-11-21 DIAGNOSIS — F98.8 ATTENTION DEFICIT DISORDER, UNSPECIFIED TYPE: ICD-10-CM

## 2024-11-21 PROCEDURE — 3078F DIAST BP <80 MM HG: CPT | Mod: CPTII,S$GLB,, | Performed by: INTERNAL MEDICINE

## 2024-11-21 PROCEDURE — 99999 PR PBB SHADOW E&M-EST. PATIENT-LVL III: CPT | Mod: PBBFAC,,, | Performed by: INTERNAL MEDICINE

## 2024-11-21 PROCEDURE — 3008F BODY MASS INDEX DOCD: CPT | Mod: CPTII,S$GLB,, | Performed by: INTERNAL MEDICINE

## 2024-11-21 PROCEDURE — 99204 OFFICE O/P NEW MOD 45 MIN: CPT | Mod: S$GLB,,, | Performed by: INTERNAL MEDICINE

## 2024-11-21 PROCEDURE — 1159F MED LIST DOCD IN RCRD: CPT | Mod: CPTII,S$GLB,, | Performed by: INTERNAL MEDICINE

## 2024-11-21 PROCEDURE — 3074F SYST BP LT 130 MM HG: CPT | Mod: CPTII,S$GLB,, | Performed by: INTERNAL MEDICINE

## 2024-11-21 NOTE — PROGRESS NOTES
Subjective     Patient ID: Carmine Rodriguez is a 24 y.o. male.    Chief Complaint: Establish Care      HPI  here to establish care.  Pt currently on wellbutrin, states mood stable with this regimen.  Interested in getting back on ADD medication; in the past he didn't like how it made him feel.  Feels like it is necessary for work/school.  Currently in counseling.  No si/hi.      Past Medical History:   Diagnosis Date    ADHD (attention deficit hyperactivity disorder)      Review of patient's allergies indicates:  No Known Allergies  No past surgical history on file.  Family History   Problem Relation Name Age of Onset    Depression Father Seng Rodriguez     ADD / ADHD Father Seng Rodriguez     Breast cancer Paternal Grandmother      No Known Problems Mother      Cancer Paternal Grandfather Anotny         Lung cancer    Glaucoma Neg Hx       Social History     Socioeconomic History    Marital status: Single   Tobacco Use    Smoking status: Former     Current packs/day: 0.00     Types: Vaping with nicotine, Cigarettes     Passive exposure: Current    Smokeless tobacco: Never   Substance and Sexual Activity    Alcohol use: Yes     Alcohol/week: 5.0 standard drinks of alcohol     Types: 5 Cans of beer per week    Drug use: Not Currently    Sexual activity: Yes     Partners: Female   Social History Narrative    ** Merged History Encounter **          Social Drivers of Health     Financial Resource Strain: High Risk (6/2/2023)    Received from [employer] [facility] and Its Subsidiaries and Affiliates, [facility] and Its Subsidiaries and Affiliates    Overall Financial Resource Strain (CARDIA)     Difficulty of Paying Living Expenses: Hard   Food Insecurity: Food Insecurity Present (6/2/2023)    Received from [employer] [facility] and Its Subsidiaries and Affiliates, [facility] and Its  [facility] and Affiliates    Hunger Vital Sign     Worried About Running Out of Food in the Last Year: Often true     Ran Out of Food in the Last Year: Sometimes true   Transportation Needs: No Transportation Needs (6/2/2023)    [facility], [facility]    PRAPARE - Transportation     Lack of Transportation (Medical): No     Lack of Transportation (Non-Medical): No   Physical Activity: Insufficiently Active (6/2/2023)    [facility], [facility] and Its [facility] and Affiliates    Exercise Vital Sign     Days of Exercise per Week: 1 day     Minutes of Exercise per Session: 20 min   Stress: Stress Concern Present (6/2/2023)    [facility], [facility] Its [facility]    [place] [facility] - Occupational Stress Questionnaire     Feeling of Stress : Rather much   Housing Stability: Low Risk  (6/2/2023)    [facility], [facility] and Its [facility] and Affiliates    Housing Stability Vital Sign     Unable to Pay for Housing in the Last Year: No     Number of Places Lived in the Last Year: 2     Unstable Housing in the Last Year: No         /78   Pulse 96   Wt 105 kg (231 lb 7 oz)   SpO2 98%   BMI 29.71 kg/m²   Outpatient Medications as of 11/21/2024   Medication Sig Dispense Refill    buPROPion (WELLBUTRIN XL) 300 MG 24 hr tablet TAKE 1 TABLET BY MOUTH EVERY DAY 90 tablet 0     No current facility-administered medications on file  as of 11/21/2024.       Review of Systems   All other systems reviewed and are negative.         Objective     Physical Exam  Constitutional:       Appearance: Normal appearance.   HENT:      Head: Normocephalic and atraumatic.   Cardiovascular:      Rate and Rhythm: Normal rate.   Pulmonary:      Effort: No respiratory distress.   Musculoskeletal:      Cervical back: Neck supple.   Neurological:      Mental Status: He is alert and oriented to person, place, and time.   Psychiatric:         Mood and Affect: Mood normal.         Behavior: Behavior normal.            Assessment and Plan     1. Establishing care with new doctor, encounter for    2. Major depressive disorder with single episode, in partial remission  Comments:  recently started counseling with [facility]; 11/2024; wellbutrin x 3- 4years; pt reports works well for him  Orders:  -     Ambulatory referral/consult to Psychiatry; Future; Expected date: 11/30/2024    3. Attention deficit disorder, unspecified type  -     Ambulatory referral/consult to Psychiatry; Future; Expected date: 11/30/2024         Advised pt since psych is involved, to f/u with mental health provider for all mood possible altering medications.      F/u for physical when ready      There is no immunization history on file for this patient.

## 2024-11-23 PROBLEM — L02.91 ABSCESS: Status: RESOLVED | Noted: 2021-06-09 | Resolved: 2024-11-23

## 2024-12-03 ENCOUNTER — TELEPHONE (OUTPATIENT)
Dept: PSYCHIATRY | Facility: CLINIC | Age: 24
End: 2024-12-03
Payer: COMMERCIAL

## 2024-12-03 NOTE — TELEPHONE ENCOUNTER
Rtc to r/s n/p appointment with Dr.Jay Gotti. From 12/3/24 to Tuesday 12/10/24 at 10:30 am. Due to the Flu.

## 2024-12-06 ENCOUNTER — OFFICE VISIT (OUTPATIENT)
Dept: URGENT CARE | Facility: CLINIC | Age: 24
End: 2024-12-06
Payer: COMMERCIAL

## 2024-12-06 VITALS
RESPIRATION RATE: 12 BRPM | SYSTOLIC BLOOD PRESSURE: 122 MMHG | WEIGHT: 226.75 LBS | OXYGEN SATURATION: 98 % | HEART RATE: 83 BPM | DIASTOLIC BLOOD PRESSURE: 88 MMHG | TEMPERATURE: 100 F | BODY MASS INDEX: 29.1 KG/M2 | HEIGHT: 74 IN

## 2024-12-06 DIAGNOSIS — R50.9 FEVER, UNSPECIFIED FEVER CAUSE: ICD-10-CM

## 2024-12-06 DIAGNOSIS — J11.1 INFLUENZA: Primary | ICD-10-CM

## 2024-12-06 DIAGNOSIS — R05.9 COUGH, UNSPECIFIED TYPE: ICD-10-CM

## 2024-12-06 LAB
CTP QC/QA: YES
CTP QC/QA: YES
POC MOLECULAR INFLUENZA A AGN: POSITIVE
POC MOLECULAR INFLUENZA B AGN: NEGATIVE
SARS-COV-2 AG RESP QL IA.RAPID: NEGATIVE

## 2024-12-06 PROCEDURE — 87811 SARS-COV-2 COVID19 W/OPTIC: CPT | Mod: QW,S$GLB,, | Performed by: FAMILY MEDICINE

## 2024-12-06 PROCEDURE — 99213 OFFICE O/P EST LOW 20 MIN: CPT | Mod: S$GLB,,, | Performed by: FAMILY MEDICINE

## 2024-12-06 PROCEDURE — 87502 INFLUENZA DNA AMP PROBE: CPT | Mod: QW,S$GLB,, | Performed by: FAMILY MEDICINE

## 2024-12-06 RX ORDER — BROMPHENIRAMINE MALEATE, PSEUDOEPHEDRINE HYDROCHLORIDE, AND DEXTROMETHORPHAN HYDROBROMIDE 2; 30; 10 MG/5ML; MG/5ML; MG/5ML
10 SYRUP ORAL EVERY 6 HOURS PRN
Qty: 150 ML | Refills: 0 | Status: SHIPPED | OUTPATIENT
Start: 2024-12-06

## 2024-12-06 RX ORDER — BUSPIRONE HYDROCHLORIDE 5 MG/1
5 TABLET ORAL 2 TIMES DAILY
COMMUNITY
End: 2024-12-10

## 2024-12-06 RX ORDER — PREDNISONE 20 MG/1
20 TABLET ORAL 2 TIMES DAILY
Qty: 6 TABLET | Refills: 0 | Status: SHIPPED | OUTPATIENT
Start: 2024-12-06 | End: 2024-12-09

## 2024-12-06 NOTE — PROGRESS NOTES
"Subjective:      Patient ID: Carmine Rodriguez is a 24 y.o. male.    Vitals:  height is 6' 2" (1.88 m) and weight is 102.9 kg (226 lb 11.9 oz). His oral temperature is 99.6 °F (37.6 °C). His blood pressure is 122/88 and his pulse is 83. His respiration is 12 and oxygen saturation is 98%.     Chief Complaint: Cough    23 yo male patient states his symptoms began five days ago. Fever, chills, body aches, cough.  States he didn't come in, decided to let it run it's course, however last night his cough was worse. No fever or chills now. No sob or cp. No gi symptoms.        Cough  This is a new problem. The current episode started in the past 7 days (Onset Monday). The problem has been gradually worsening. The problem occurs every few minutes. The cough is Productive of sputum. Associated symptoms include headaches, nasal congestion, postnasal drip and rhinorrhea. Pertinent negatives include no chest pain (chest discomfort), chills, ear congestion, ear pain, fever, hemoptysis, myalgias, rash, sore throat, shortness of breath or wheezing. Associated symptoms comments: Burning sensation when coughing  . The symptoms are aggravated by lying down and exercise. He has tried OTC cough suppressant (Ibuprofen, Tylenol, Sudafed) for the symptoms. The treatment provided mild relief. His past medical history is significant for environmental allergies. There is no history of asthma, bronchitis or pneumonia.       Constitution: Positive for fatigue. Negative for chills and fever.   HENT:  Positive for congestion and postnasal drip. Negative for ear pain and sore throat.    Cardiovascular:  Negative for chest pain (chest discomfort).   Eyes: Negative.    Respiratory:  Positive for cough. Negative for bloody sputum, shortness of breath and wheezing.    Gastrointestinal: Negative.    Genitourinary: Negative.    Musculoskeletal:  Negative for muscle ache.   Skin:  Negative for rash.   Allergic/Immunologic: Positive for environmental " allergies.   Neurological:  Positive for headaches.      Objective:     Physical Exam   Constitutional: He is oriented to person, place, and time.   HENT:   Head: Normocephalic.   Ears:   Right Ear: Tympanic membrane, external ear and ear canal normal.   Left Ear: Tympanic membrane, external ear and ear canal normal.   Nose: Congestion present. No purulent discharge or sinus tenderness. No epistaxis.   Mouth/Throat: Mucous membranes are moist. No oropharyngeal exudate or posterior oropharyngeal erythema. Oropharynx is clear.   Eyes: Conjunctivae are normal. Pupils are equal, round, and reactive to light.   Neck: Neck supple.   Cardiovascular: Normal rate, regular rhythm, normal heart sounds and normal pulses.   Pulmonary/Chest: Effort normal and breath sounds normal.   Abdominal: Normal appearance. He exhibits no distension. Soft. There is no abdominal tenderness.   Musculoskeletal: Normal range of motion.         General: Normal range of motion.   Neurological: no focal deficit. He is alert and oriented to person, place, and time.   Skin: Skin is warm.         Comments: Normal turgor   Psychiatric: His behavior is normal. Mood, judgment and thought content normal.   Nursing note and vitals reviewed.      Assessment:     1. Influenza    2. Fever, unspecified fever cause    3. Cough, unspecified type        Plan:       Influenza    Fever, unspecified fever cause  -     POCT Influenza A/B MOLECULAR  -     SARS Coronavirus 2 Antigen, POCT Manual Read    Cough, unspecified type  -     POCT Influenza A/B MOLECULAR  -     SARS Coronavirus 2 Antigen, POCT Manual Read    Other orders  -     brompheniramine-pseudoeph-DM (BROMFED DM) 2-30-10 mg/5 mL Syrp; Take 10 mLs by mouth every 6 (six) hours as needed (cough/congestion).  Dispense: 150 mL; Refill: 0  -     predniSONE (DELTASONE) 20 MG tablet; Take 1 tablet (20 mg total) by mouth 2 (two) times daily. for 3 days  Dispense: 6 tablet; Refill: 0      Results for orders placed  or performed in visit on 12/06/24   POCT Influenza A/B MOLECULAR    Collection Time: 12/06/24  8:45 AM   Result Value Ref Range    POC Molecular Influenza A Ag Positive (A) Negative    POC Molecular Influenza B Ag Negative Negative     Acceptable Yes    SARS Coronavirus 2 Antigen, POCT Manual Read    Collection Time: 12/06/24  9:01 AM   Result Value Ref Range    SARS Coronavirus 2 Antigen Negative Negative     Acceptable Yes        SUMMARY:  Flu. Contact precautions. Supportive measures. Too late to see benefit from Antiviral. Handout on flu given as well. Immediate medical provider evaluation if worsens.     Patient Instructions   Thank you for allowing our team to take care of you today.  Your diagnosis is the Flu.  Covid negative.   Antiviral medication not given since more than two days have passed from symptoms start.  Supportive measures.  If any symptoms continue or worsen, get an immediate medical provider/ER evaluation.  Followup here as needed.     SYMPTOM MEDICATIONS IF NEEDED AND APPROPRIATE:    You may gargle with warm salt water 4 times a day and as needed.     Drink plenty of fluids. You can also drink warm tea with honey.     Make sure you are getting rest.    You can use cough drops or lozenges to soothe your sore throat.     You can vitamin C, D, zinc to help boost your immune system.    You can use your prescription cough medication if needed. Prednisone steroid for three days.     Flonase nasal spray can be used for nasal congestion. Two sprays each nostril daily.     Honey is a natural cough suppressant that can be used.

## 2024-12-06 NOTE — PATIENT INSTRUCTIONS
Thank you for allowing our team to take care of you today.  Your diagnosis is the Flu.  Covid negative.   Antiviral medication not given since more than two days have passed from symptoms start.  Supportive measures.  If any symptoms continue or worsen, get an immediate medical provider/ER evaluation.  Followup here as needed.     SYMPTOM MEDICATIONS IF NEEDED AND APPROPRIATE:    You may gargle with warm salt water 4 times a day and as needed.     Drink plenty of fluids. You can also drink warm tea with honey.     Make sure you are getting rest.    You can use cough drops or lozenges to soothe your sore throat.     You can vitamin C, D, zinc to help boost your immune system.    You can use your prescription cough medication if needed. Prednisone steroid for three days.     Flonase nasal spray can be used for nasal congestion. Two sprays each nostril daily.     Honey is a natural cough suppressant that can be used.

## 2024-12-06 NOTE — LETTER
December 6, 2024    Carmine Rodriguez  [address] [facility] [id number]             [facility]  Urgent Care  [address] [place] [address] [place] [facility] [place] [address]  Phone: [phone]  Fax: [phone]   December 6, 2024     Patient: Carmine Rodriguez   YOB: 2000   Date of Visit: 12/6/2024       To Whom it May Concern:    Carmine Rodriguez was seen in my clinic on 12/6/2024. He may return to work on 12/08/2024 .    Please excuse him from any classes or work missed.    If you have any questions or concerns, please don't hesitate to call.    Sincerely,         Juliana Quiroz MD

## 2024-12-10 ENCOUNTER — OFFICE VISIT (OUTPATIENT)
Dept: PSYCHIATRY | Facility: CLINIC | Age: 24
End: 2024-12-10
Payer: COMMERCIAL

## 2024-12-10 ENCOUNTER — PATIENT MESSAGE (OUTPATIENT)
Dept: PSYCHIATRY | Facility: CLINIC | Age: 24
End: 2024-12-10
Payer: COMMERCIAL

## 2024-12-10 ENCOUNTER — HOSPITAL ENCOUNTER (OUTPATIENT)
Dept: CARDIOLOGY | Facility: HOSPITAL | Age: 24
Discharge: HOME | End: 2024-12-10
Attending: PSYCHIATRY & NEUROLOGY
Payer: COMMERCIAL

## 2024-12-10 VITALS
WEIGHT: 224.44 LBS | BODY MASS INDEX: 28.81 KG/M2 | DIASTOLIC BLOOD PRESSURE: 92 MMHG | HEART RATE: 78 BPM | SYSTOLIC BLOOD PRESSURE: 137 MMHG

## 2024-12-10 DIAGNOSIS — F12.10 CANNABIS ABUSE, DAILY USE: ICD-10-CM

## 2024-12-10 DIAGNOSIS — F98.8 ATTENTION DEFICIT DISORDER, UNSPECIFIED TYPE: ICD-10-CM

## 2024-12-10 DIAGNOSIS — F41.9 ANXIETY: Primary | ICD-10-CM

## 2024-12-10 DIAGNOSIS — F33.41 RECURRENT MAJOR DEPRESSIVE DISORDER, IN PARTIAL REMISSION: ICD-10-CM

## 2024-12-10 LAB
OHS QRS DURATION: 98 MS
OHS QTC CALCULATION: 388 MS

## 2024-12-10 PROCEDURE — 93010 ELECTROCARDIOGRAM REPORT: CPT | Mod: ,,, | Performed by: INTERNAL MEDICINE

## 2024-12-10 PROCEDURE — 99999 PR PBB SHADOW E&M-EST. PATIENT-LVL III: CPT | Mod: PBBFAC,,, | Performed by: PSYCHIATRY & NEUROLOGY

## 2024-12-10 PROCEDURE — 93005 ELECTROCARDIOGRAM TRACING: CPT

## 2024-12-10 RX ORDER — BUPROPION HYDROCHLORIDE 150 MG/1
150 TABLET ORAL DAILY
Qty: 30 TABLET | Refills: 11 | Status: SHIPPED | OUTPATIENT
Start: 2024-12-10 | End: 2025-12-10

## 2024-12-10 RX ORDER — HYDROXYZINE HYDROCHLORIDE 10 MG/1
10 TABLET, FILM COATED ORAL DAILY PRN
Qty: 30 TABLET | Refills: 0 | Status: SHIPPED | OUTPATIENT
Start: 2024-12-10 | End: 2025-01-09

## 2024-12-10 RX ORDER — ATOMOXETINE 40 MG/1
40 CAPSULE ORAL DAILY
Qty: 30 CAPSULE | Refills: 0 | Status: SHIPPED | OUTPATIENT
Start: 2024-12-10 | End: 2025-01-09

## 2024-12-10 NOTE — PROGRESS NOTES
"Outpatient Psychiatry Initial Visit     12/10/2024    Carmine Rodriguez, a 24 y.o. male, presenting for Initial Psychiatric Evaluation visit. Met with patient.    Reason for Encounter: Patient complains of .    History of Present Illness:   Therapist note from 11/11/2024: His anxiety and depression is preventing him from what he wants to do.  It causes difficulty in his relationship.  He will have depressive episode.  He is not able to do anything.  He will not go to class.  He has failed several classes.  He does not have crying spells.  He doesn't remember the last time he cried.  He went to bed at two in the morning today.  He woke up at 830 for the appointment.  He will sometimes sleep till 11 or noon.  He will watch [employer] videos and play games.  He has gained 45 pounds over the past two years.  He has recently lost ten pounds walking.  He has poor concentration and focus.   He will get irritable when he is depressed.  He doesn't always feel that way.  His girlfriend thinks he is doing better recently.  His depression was worse in the past.  He thinks exercise and eating better has helped him.  He gets stressed about school.  He does not like being around people.  He has some social anxiety.        Today:     "I was hoping to get a prescription of ADHD."  ADHD since age 7. Was on Focalin, Vyvanse, ritalin, etc. Pt reports that he always felt like a zombie and never felt like myself on stimulants.  Stopped medications at age 19. Took Concerta for about 5 years. This was the only medication he could take for awhile before developing side effects. He had a significant loss of appetite. Felt irritable on Vyvanse.   Last psychiatry visit was 2018.     Sleep: goes to bed around midnight. Wakes up around 9 am. Able to sleep through the night  Ayde: normal. Eats about 2-3 meals.  Energy: low.   Denies side effects on current medications.      ADHD: fidgety, overtalkative, interrupts, careless mistakes, inattentive, no " follow-through, disorganized, forgetful, easily distracted   Depressive Disorder: depressed mood, irritable mood, appetite/weight change, tired/fatigued, worthlessness, guilt, concentration problems, hopelessness, social isolation/withdrawal   Anxiety Disorder: anxiety/nervousness, fatigue, irritability, concentration problems, excessive worry   Panic Disorder: denied   Manic Disorder: irritable mood, increased distractability, waxing/waning, can be impulsive   Psychotic Disorder: IOR especially when he steps out of the house.    Substance Use:  Alcohol: 4 times a week, Caffeine: black coffee x 2 cups; no energy drinks, and Marijuana: daily     Low self esteem and low confidence issues come from his inability to follow through.   Worried about his career.   Grades -- failed every class last semester.   He is taking online courses. He was supposed to graduate in Dec but now it will be May 25.  He was not always passionate about what he is studying.     Stressors:  School  Money  Find Job  Making my parents happy     Past Psychiatric Hx:  Pt reports being on Wellbutrin for depression   Buspar for anxiety  Depression was diagnosed in June 2021. It was pretty bad. He reports having suicidal thoughts too. Wellbutrin  helped. He had low energy when depressed. Depression never went away completely.  He was in counseling briefly. It helped.   No IOP.   Had few more episodes of depression since 2021   No suicide attempts.   Denies being hospitalized for depression or anxiety.      Family Psychiatric Hx:  Grandfather on dad's side -- ?depression  Dad has depression  Brother has depression and anxiety. He is on lexapro  Mom has anxiety too.  Father and brother have ADD.   No suicide in family    Parents are expecting him to find a job and do well in school.  A push from his GF and his family is making him seek more help.       Personal/Psychosocial Hx:   Birth Hx: [place]  Education: Senior year at [employer]. BA in Political  Science. Switched from Mass Communication. His dad and brother have Political Science.     Occupation: Part time -- [employer] for 3 years. Works about 15 hours.   Marital status: Not . In a relationship. GF is a SW. No children  Living situation: Renting. Lives with his GF. Has a cat.   Nondenominational: no    Legal: Denies    Abuse: denies    Alcohol & Drug use History:   Drinks about 4 drinks per week. Beer. Usually 3 per sitting. No DUI; no black outs  Started drinking at age 16  MJ: started using at age 18; uses it every day. Uses twice but typically at night. Recreational.  Meth: denies. Denies other drugs.             11/11/2024     8:52 AM   GAD7   1. Feeling nervous, anxious, or on edge? 1    2. Not being able to stop or control worrying? 1    3. Worrying too much about different things? 1    4. Trouble relaxing? 0    5. Being so restless that it is hard to sit still? 0    6. Becoming easily annoyed or irritable? 2    7. Feeling afraid as if something awful might happen? 0    8. If you checked off any problems, how difficult have these problems made it for you to do your work, take care of things at home, or get along with other people? 1    CHIO-7 Score 5        Patient-reported      0-4 = Minimal anxiety  5-9 = Mild anxiety  10-14 = Moderate anxiety  15-21 = Severe anxiety             12/6/2021    10:27 AM   Depression Patient Health Questionnaire   Over the last two weeks how often have you been bothered by little interest or pleasure in doing things Not at all   Over the last two weeks how often have you been bothered by feeling down, depressed or hopeless Not at all   PHQ-2 Total Score 0     0-4 = No intervention  5 to 9 = Mild  10 to 14 = Moderate  15 to 19 = Moderately severe  =20 = Severe      Review Of Systems:     GENERAL:  losing weight due to flu  SKIN:  No rashes or lacerations  HEAD:  No headaches  EYES:  No exophthalmos, jaundice or blindness  EARS:  No dizziness, tinnitus or hearing loss  NOSE:   No changes in smell  MOUTH & THROAT:  No dyskinetic movements or obvious goiter  CHEST:  No shortness of breath, hyperventilation;  cough +  CARDIOVASCULAR:  No tachycardia or chest pain  ABDOMEN:  No nausea, vomiting, pain, constipation or diarrhea  URINARY:  No frequency, dysuria or sexual dysfunction  ENDOCRINE:  No polydipsia, polyuria  MUSCULOSKELETAL:  No pain or stiffness of the joints  NEUROLOGIC:  No weakness, sensory changes, seizures, confusion, memory loss, tremor or other abnormal movements    Current Evaluation:     Nutritional Screening: Considering the patient's height and weight, medications, medical history and preferences, should a referral be made to the dietitian?has PCP    Constitutional  Vitals:  Most recent vital signs, dated less than 90 days prior to this appointment, were reviewed.    Vitals:    12/10/24 1025   BP: (!) 137/92   Pulse: 78   Weight: 101.8 kg (224 lb 6.9 oz)        General:  unremarkable, age appropriate     Musculoskeletal  Muscle Strength/Tone:  no tremor, no tic   Gait & Station:  non-ataxic     Psychiatric  Appearance: casually dressed & fairly groomed;   Behavior: calm, good eye contact  Cooperation: cooperative with assessment  Speech: normal rate, volume, tone; spontaneous  Thought Process: linear, goal-directed  Thought Content: No suicidal or homicidal ideation; no delusions  Affect: fair range  Mood: depressed/anxious  Perceptions: No auditory or visual hallucinations  Level of Consciousness: alert throughout interview  Insight: fair  Cognition: Oriented to person, place, time, & situation  Memory: no apparent deficits to general clinical interview.   Attention/Concentration: no apparent deficits to general clinical interview.   Fund of Knowledge: average by vocabulary/education    Risk Parameters:  Patient reports no suicidal ideation  Patient reports no homicidal ideation  Patient reports no self-injurious behavior  Patient reports no violent  behavior    Laboratory Data  Hospital Outpatient Visit on 12/10/2024   Component Date Value Ref Range Status    QRS Duration 12/10/2024 98  ms Final    [facility] QTC Calculation 12/10/2024 388  ms Final   Office Visit on 2024   Component Date Value Ref Range Status    POC Molecular Influenza A Ag 2024 Positive (A)  Negative Final    POC Molecular Influenza B Ag 2024 Negative  Negative Final     Acceptable 2024 Yes   Final    SARS Coronavirus 2 Antigen 2024 Negative  Negative Final     Acceptable 2024 Yes   Final       Medications  Outpatient Encounter Medications as of 12/10/2024   Medication Sig Dispense Refill    atomoxetine (STRATTERA) 40 MG capsule Take 1 capsule (40 mg total) by mouth once daily. 30 capsule 0    brompheniramine-pseudoeph-DM (BROMFED DM) 2-30-10 mg/5 mL Syrp Take 10 mLs by mouth every 6 (six) hours as needed (cough/congestion). 150 mL 0    buPROPion (WELLBUTRIN XL) 150 MG TB24 tablet Take 1 tablet (150 mg total) by mouth once daily. 30 tablet 11    buPROPion (WELLBUTRIN XL) 300 MG 24 hr tablet TAKE 1 TABLET BY MOUTH EVERY DAY 90 tablet 0    hydrOXYzine HCL (ATARAX) 10 MG Tab Take 1 tablet (10 mg total) by mouth daily as needed (for anxiety). 30 tablet 0    [] predniSONE (DELTASONE) 20 MG tablet Take 1 tablet (20 mg total) by mouth 2 (two) times daily. for 3 days 6 tablet 0    [DISCONTINUED] busPIRone (BUSPAR) 5 MG Tab Take 5 mg by mouth 2 (two) times daily.       No facility-administered encounter medications on file as of 12/10/2024.       Assessment - Diagnosis - Goals:     Impression and Treatment Plan:     This is a 24 year old SWM who was diagnosed to have ADD/ADHD while in school around age 7. He did not tolerate multiple trials of stimulants. He developed enough side effects to stop the medications. He was on concerta for about 5 years. He stopped all medications when he was about 19 years old.   In , he developed  severe depression and anxiety. He had SI at that time. He responded to Wellbutrin. He has been on it since 2021. Post 2021, he developed multiple episodes of depression. He managed it with dose adjustments of Wellbutrin and counseling. However, his depression never went into remission since 2021. He appears to have possible dysthymia too.   Pt has a strong family hx of depression, anxiety, and ADD.   Pt is currently in the final semester of BA degree at [employer]. He failed in all of the subjects last semester. He is worried about his career, his prospects for landing a good job and meeting his parents expectations. He is in a stable relationship.   Pt uses MJ on a daily basis and drink socially 3-4 times a week.   Pt endorses symptoms of depression, attn/conc, and some anxiety.     Discussed with patient the diagnostic issues.  Discussed that his depression and anxiety are not completely resolved. They need to be treated more aggressively. Pt agrees.  We also discussed that stimulants may not be the best choice given past Hx of side effects as well as current use of MJ.   Discussed with pt that we will check UDS to ensure that he is without any drugs of abuse before we prescribe Stimulants in future.  Discussed that recreational use of MJ is most likely a combined recreational as well as therapeutic use too. However, MJ will interfere with his motivation, cognition, etc differently compared to those who do not have any mental health issues. We can attempt to treat those symptoms with medications that are currently ameliorated by MJ and its metabolites.    We discussed various strategies going forward.   (1) Use Atomoxetine for ADD in combination with an increased dose of Wellbutrin to 450 mg.  -- increase dose of Wellbutrin will help depression as well as ADD  -- Atomoxetine can help with ADD and may decrease depression and anxiety.  (2) Increase Wellbutrin to 450 mg per day. Add Lexapro (or another SSRI) to help with  depression and anxiety.     Pt chose # 1.  We will start him on Atomoxetine 40 mg po daily  Increase Wellbutrin XL to 450 mg po daily     We will also stop Buspar for now.   To help with anxiety, we will give him Hydroxyzine 10 mg po prn anxiety.    Discussed attempting to decrease and stop MJ. Pt will try    We will check Vitamin B12, Folic Acid, CMP, CBC as baseline  We will check EKG in case he is switched to a stimulant in near future    Additionally,  Advised pt to consult [facility] career counseling center to see if they can guide him with possible choices he will do well with.  Continue counseling     Monitor side effects.     Counseling done       ICD-10-CM ICD-9-CM   1. Anxiety  F41.9 300.00   2. Recurrent major depressive disorder, in partial remission  F33.41 296.35   3. Attention deficit disorder, unspecified type  F98.8 314.00   4. Cannabis abuse, daily use  F12.10 305.21       Treatment Goals:  Specify outcomes written in observable, behavioral terms:   The treatment plan will use a biopsychosocial approach including self help strategies to achieve the goals of optimizing physical and mental health.    Anxiety: decrease anxiety intensity, duration and frequency such that patient will have an improved productivity and QOL.  Reduce physical symptoms of anxiety and time spent worrying (<30 minutes/day)   Use Psychoeducation and therapy to increase coping skills to manage anxiety. There will be 50% improvement in symptoms in next 6-12 weeks and 70% or more improvement in next 12 months.   Decrease frequency and intensity of panic attacks.   Use medications as appropriate to augment therapy and self help with limited/manageable side effects burden    Depression: decrease or eliminate symptoms of depression using medications with limited/manageable side effects burden. Pt will stop feeling hopeless and helpless, have improved energy, increase socialization, and have an improvement in productivity and QOL. There  will be about 50% improvement in symptoms in 6 - 12 weeks and about 70% or more in one year.     Decrease, eliminate or prevent suicidal thoughts and actions. Use safety plan including hospitalization to prevent suicidality.     Attention/conc: Pt will have 50% decrease in ADD symptoms within next 12 weeks and 70% or more within next 6 months.     Sobriety: Pt will work towards sobriety from cannabis use within 3-4 months      Future treatment will utilize CBT, IOP, Mindfulness Techniques, ACT, Solution-focused Therapy, and Relaxation Techniques .     Treatment Plan/Recommendations:   See above    Safety Plan/Support System:   Pt has a good support system.He is able to ask for help    Return to Clinic: 2 weeks    I spent an additional 10 minutes performing E/M services with >50% spent on counseling, guidance, coordinating care (not Psychotherapy related) in addition to the 20 minutes performing Psychotherapy.    This includes face to face time and non-face to face time preparing to see the patient (eg, review of tests), obtaining and/or reviewing separately obtained history, documenting clinical information in the electronic or other health record, independently interpreting results and communicating results to the patient/family/caregiver, or care coordinator.    Time spent with pt including note preparation: 90 minutes      Megan Gotti MD  Psychiatry

## 2024-12-10 NOTE — PATIENT INSTRUCTIONS

## 2024-12-23 ENCOUNTER — TELEPHONE (OUTPATIENT)
Dept: PSYCHIATRY | Facility: CLINIC | Age: 24
End: 2024-12-23
Payer: COMMERCIAL

## 2024-12-23 ENCOUNTER — PATIENT MESSAGE (OUTPATIENT)
Dept: PSYCHIATRY | Facility: CLINIC | Age: 24
End: 2024-12-23
Payer: COMMERCIAL

## 2025-04-03 ENCOUNTER — OFFICE VISIT (OUTPATIENT)
Dept: PSYCHIATRY | Facility: CLINIC | Age: 25
End: 2025-04-03
Payer: COMMERCIAL

## 2025-04-03 VITALS — WEIGHT: 215.75 LBS | HEIGHT: 74 IN | BODY MASS INDEX: 27.69 KG/M2

## 2025-04-03 DIAGNOSIS — F32.A DEPRESSION, UNSPECIFIED DEPRESSION TYPE: ICD-10-CM

## 2025-04-03 DIAGNOSIS — R41.840 ATTENTION DISTURBANCE: Primary | ICD-10-CM

## 2025-04-03 PROCEDURE — 99999 PR PBB SHADOW E&M-EST. PATIENT-LVL III: CPT | Mod: PBBFAC,,, | Performed by: PSYCHIATRY & NEUROLOGY

## 2025-04-03 PROCEDURE — 90792 PSYCH DIAG EVAL W/MED SRVCS: CPT | Mod: S$GLB,,, | Performed by: PSYCHIATRY & NEUROLOGY

## 2025-04-03 RX ORDER — BUPROPION HYDROCHLORIDE 300 MG/1
300 TABLET ORAL DAILY
Qty: 30 TABLET | Refills: 1 | Status: SHIPPED | OUTPATIENT
Start: 2025-04-03 | End: 2025-06-10

## 2025-04-03 NOTE — PROGRESS NOTES
"Outpatient Psychiatry Initial Visit (MD)    4/3/2025    Referral From:  No referring provider defined for this encounter.    Reason for Encounter/Chief Complaint: "I was looking for treatment for my ADHD."    History of Present Illness:     Carmine Rodriguez is a 25 y.o. male with history of attention disturbance, depression who presents to clinic for initial evaluation with chief complaint above. States he was diagnosed w/ ADHD around age 7 and was prescribed stimulants for several years after that.  Reports he does not remember all past med trials, but does remember trials of Focalin, Concerta, Vyvanse.  Describes feeling "like a zombie" on stimulants. Most recently, prescribed Strattera by [facility] physician a few months ago but feels it was ineffective after taking for 3 weeks. He reports having ongoing difficulty with focus as an adult, but explains this is now more concerning for him because he is completing an internship. It is important to him to perform well in work environment. Also still attending college, has difficulty completing chores. Reports taking Wellbutrin Xl 150 mg daily, which was started in 2021 for depression. At one point increased to 300 mg daily. Notes that higher dose associated with improved mood, energy levels and concentration. Currently, patient describes mood as "pretty good."  States he currently walking every day. Endorses fair energy and appetite. Denies crying spells or difficulty sleeping, denies depressed mood. States he enjoys making homemade ice cream. Reports he enjoys walking and cooking. Patient denies excessive anxiety that's difficult to control, restlessness, irritability. Denies history c/w psychosis or evette. No past psychiatric hospitalizations. Denies SI/HI/AVH.      Review Of Systems:     Pertinent items noted in HPI.     Past Psychiatric History/ Social History/ Family History:     Substance use: Previous THC use, states no longer using  Psychiatric " "hospitalizations: denies  Suicide attempts: denies h/o suicide attempts, last suicidal thoughts in 2021, no attempts.  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        Medications:     Encounter Medications[1]      Allergies:     Review of patient's allergies indicates:  No Known Allergies       Current Evaluation:     Nutritional Screening: Considering the patient's height and weight, medications, medical history and preferences, should a referral be made to the dietitian? no    Vitals:  Vitals:  Most recent vital signs were reviewed.    Vitals:    04/03/25 1414   Weight: 97.8 kg (215 lb 11.5 oz)   Height: 6' 2" (1.88 m)          Mental Status Exam:  Appearance: Appears stated age, in NAD, casually dressed, well-groomed  Behavior: Calm, fair eye contact  Speech: regular rate, rhythm, volume  Mood: "good"  Affect: mood congruent  Gait: normal  Movements: no tremor, no PMA/PMR noted  Thought Process: linear, logical goal-directed  Thought Content: Denies SI/HI. No delusions reported or apparent  Thought Perception: Denies AH/VH  Cognition: AAOX3, memory intact  Awareness: Fair  Judgement: Fair     Laboratory Data  Most recent labs reviewed    Assessment - Diagnosis - Plan:     Diagnoses   Attention Disturbance  Depression, unspecified    Assessment/Treatment Plan:   - Reports euthymic mood currently, but describes " experiencing depressed mood prior to initiation of Wellbutrin in 2021.   - Increase Wellbutrin XL to 300 mg daily, reports improvement in mood, concentration and energy on this dose  - Referral for therapy  - Referral for formal neuropsych/ADHD testing  - R/B/A discussed and patient consents to treatment      Return to Clinic: 6 weeks           [1]   Outpatient Encounter Medications as of 4/3/2025   Medication Sig Dispense Refill    buPROPion (WELLBUTRIN XL) 150 MG TB24 tablet Take 1 tablet (150 mg total) by mouth once daily. 30 tablet 11    atomoxetine (STRATTERA) 40 MG capsule Take 1 capsule (40 mg total) by mouth once daily. 30 capsule 0    brompheniramine-pseudoeph-DM (BROMFED DM) 2-30-10 mg/5 mL Syrp Take 10 mLs by mouth every 6 (six) hours as needed (cough/congestion). (Patient not taking: Reported on 4/3/2025) 150 mL 0    buPROPion (WELLBUTRIN XL) 300 MG 24 hr tablet TAKE 1 TABLET BY MOUTH EVERY DAY (Patient not taking: Reported on 4/3/2025) 90 tablet 0     No facility-administered encounter medications on file as of 4/3/2025.

## 2025-04-23 ENCOUNTER — PATIENT MESSAGE (OUTPATIENT)
Dept: PSYCHIATRY | Facility: CLINIC | Age: 25
End: 2025-04-23
Payer: COMMERCIAL

## 2025-06-13 RX ORDER — BUPROPION HYDROCHLORIDE 300 MG/1
300 TABLET ORAL DAILY
Qty: 30 TABLET | Refills: 1 | OUTPATIENT
Start: 2025-06-13 | End: 2025-08-12

## 2025-06-18 ENCOUNTER — PATIENT MESSAGE (OUTPATIENT)
Dept: PSYCHIATRY | Facility: CLINIC | Age: 25
End: 2025-06-18
Payer: COMMERCIAL

## 2025-06-19 ENCOUNTER — OFFICE VISIT (OUTPATIENT)
Dept: PSYCHIATRY | Facility: CLINIC | Age: 25
End: 2025-06-19
Payer: COMMERCIAL

## 2025-06-19 VITALS
BODY MASS INDEX: 27.16 KG/M2 | WEIGHT: 211.63 LBS | SYSTOLIC BLOOD PRESSURE: 117 MMHG | HEART RATE: 76 BPM | HEIGHT: 74 IN | DIASTOLIC BLOOD PRESSURE: 75 MMHG

## 2025-06-19 DIAGNOSIS — F33.41 RECURRENT MAJOR DEPRESSIVE DISORDER, IN PARTIAL REMISSION: Primary | ICD-10-CM

## 2025-06-19 DIAGNOSIS — R41.840 ATTENTION DISTURBANCE: ICD-10-CM

## 2025-06-19 PROCEDURE — 99214 OFFICE O/P EST MOD 30 MIN: CPT | Mod: S$GLB,,, | Performed by: PSYCHIATRY & NEUROLOGY

## 2025-06-19 PROCEDURE — 3008F BODY MASS INDEX DOCD: CPT | Mod: CPTII,S$GLB,, | Performed by: PSYCHIATRY & NEUROLOGY

## 2025-06-19 PROCEDURE — 3074F SYST BP LT 130 MM HG: CPT | Mod: CPTII,S$GLB,, | Performed by: PSYCHIATRY & NEUROLOGY

## 2025-06-19 PROCEDURE — 1159F MED LIST DOCD IN RCRD: CPT | Mod: CPTII,S$GLB,, | Performed by: PSYCHIATRY & NEUROLOGY

## 2025-06-19 PROCEDURE — 99999 PR PBB SHADOW E&M-EST. PATIENT-LVL III: CPT | Mod: PBBFAC,,, | Performed by: PSYCHIATRY & NEUROLOGY

## 2025-06-19 PROCEDURE — 3078F DIAST BP <80 MM HG: CPT | Mod: CPTII,S$GLB,, | Performed by: PSYCHIATRY & NEUROLOGY

## 2025-06-19 PROCEDURE — G2211 COMPLEX E/M VISIT ADD ON: HCPCS | Mod: S$GLB,,, | Performed by: PSYCHIATRY & NEUROLOGY

## 2025-06-19 RX ORDER — BUPROPION HYDROCHLORIDE 300 MG/1
300 TABLET ORAL DAILY
Qty: 30 TABLET | Refills: 0 | Status: SHIPPED | OUTPATIENT
Start: 2025-06-19 | End: 2025-07-19

## 2025-06-19 NOTE — PROGRESS NOTES
"Outpatient Psychiatry Follow-up Visit (MD)    6/19/2025    Interval History/HPI:    Carmine Rodriguez is a 25 y.o. male who presents to clinic for follow-up evaluation. Patient reports he's been feeling better since last visit. States he is exercising more regularly and overall experiencing "slightly better mood." Describes mood as stable, reports his attention symptoms have been "about the same." Relates he was worried about running out of Wellbutrin XL, explain importance of adherence w/ appointments and need to communicate w/ team when appointments are missed. He also missed initial therapy appointment. Explains he did not miss any doses of Wellbutrin, as he was able to take some from family member's medication. He hopes Wellbutrin assists with smoking cessation, reveals he is consuming e-cigarettes again. Shares that 300 mg dose of Wellbutrin XL makes smoking less enticing.        Review Of Systems:     Pertinent items noted in HPI.     Medications:  Encounter Medications[1]    Allergies:  Review of patient's allergies indicates:  No Known Allergies       Current Evaluation:     Nutritional Screening: Considering the patient's height and weight, medications, medical history and preferences, should a referral be made to the dietitian? no    Vitals:  Vitals:  Most recent vital signs were reviewed.    Vitals:    06/19/25 1610   BP: 117/75   Pulse: 76   Weight: 96 kg (211 lb 10.3 oz)   Height: 6' 2" (1.88 m)          Mental Status Exam:  Appearance: Appears stated age, in NAD  Behavior: Calm, fair eye contact, engaged  Speech: regular rate, rhythm, volume  Mood: "slightly better"  Affect: neutral  Gait: normal  Movements: no tremor, no PMA/PMR noted  Thought Process: linear, logical goal-directed  Thought Content: Denies SI. No delusions reported or apparent  Thought Perception: No reported AH/VH  Cognition: AAOX3, memory intact  Awareness: Fair  Judgement: Fair         Assessment - Diagnosis - Goals:     Impression: "   MDD, recurrent  Attention Disturbance    Assessment/Treatment Plan:   -Continue Wellbutrin  mg daily  -Behavioral contract reviewed and signed today regarding behavioral expectations. Additionally, contract in place to re-enforce importance of treatment plan adherence.   -Patient to schedule new therapy appt to develop coping strategies/tools that can be utilized for concentration issues.   -Will hold off on formal ADHD testing for now, per patient request  -Patient aware of R/B/A and consents to continued treatment.    Return to Clinic: 3 weeks             [1]   Outpatient Encounter Medications as of 6/19/2025   Medication Sig Dispense Refill    brompheniramine-pseudoeph-DM (BROMFED DM) 2-30-10 mg/5 mL Syrp Take 10 mLs by mouth every 6 (six) hours as needed (cough/congestion). (Patient not taking: Reported on 4/3/2025) 150 mL 0    buPROPion (WELLBUTRIN XL) 300 MG 24 hr tablet Take 1 tablet (300 mg total) by mouth once daily. 30 tablet 0    [DISCONTINUED] buPROPion (WELLBUTRIN XL) 300 MG 24 hr tablet Take 1 tablet (300 mg total) by mouth once daily. 30 tablet 1     No facility-administered encounter medications on file as of 6/19/2025.

## 2025-07-11 ENCOUNTER — PATIENT MESSAGE (OUTPATIENT)
Dept: PSYCHIATRY | Facility: CLINIC | Age: 25
End: 2025-07-11
Payer: COMMERCIAL

## 2025-07-14 ENCOUNTER — OFFICE VISIT (OUTPATIENT)
Dept: PSYCHIATRY | Facility: CLINIC | Age: 25
End: 2025-07-14
Payer: COMMERCIAL

## 2025-07-14 DIAGNOSIS — F33.41 RECURRENT MAJOR DEPRESSIVE DISORDER, IN PARTIAL REMISSION: Primary | ICD-10-CM

## 2025-07-14 PROCEDURE — 98006 SYNCH AUDIO-VIDEO EST MOD 30: CPT | Mod: 95,,, | Performed by: PSYCHIATRY & NEUROLOGY

## 2025-07-14 RX ORDER — BUPROPION HYDROCHLORIDE 300 MG/1
300 TABLET ORAL DAILY
Qty: 30 TABLET | Refills: 1 | Status: SHIPPED | OUTPATIENT
Start: 2025-07-14 | End: 2025-09-14

## 2025-07-14 NOTE — PROGRESS NOTES
"Outpatient Psychiatry Follow-up Visit (MD)    7/14/2025    Patient's location is: [place]    Each patient to whom he or she provides medical services by telemedicine is:  (1) informed of the relationship between the physician and patient and the respective role of any other health care provider with respect to management of the patient; and (2) notified that he or she may decline to receive medical services by telemedicine and may withdraw from such care at any time.    Interval History/HPI:    Carmine Rodriguez is a 25 y.o. male seen via telehealth for follow-up evaluation.  Patient reports he has been doing well overall. Denies depressed mood, feels mood is stable on Wellbutrin. States online classes going well, though admits he prefers in person classes. Reports he is able to complete assignments on time.  Denies complaints or concerns, tolerating wellbutrin well.       Review Of Systems:     Pertinent items noted in HPI.     Medications:  Encounter Medications[1]    Allergies:  Review of patient's allergies indicates:  No Known Allergies       Current Evaluation:     Mental Status Exam:  Appearance: Appears stated age, in NAD  Behavior: Calm, fair eye contact, cooperative  Speech: regular rate, rhythm, volume  Mood: "good"  Affect: mood congruent  Gait: DARIUSZ  Movements: no tremor, no PMA/PMR noted  Thought Process: linear, logical goal-directed  Thought Content: No reported SI/HI. No delusions reported or apparent  Thought Perception: No reported AH/VH  Cognition: AAOX3, memory intact  Awareness: Fair  Judgement: Fair       Assessment - Diagnosis - Goals:     Diagnoses:   MDD, recurrent, moderate  ADHD, by history    Assessment/Treatment Plan:   -Continue Wellbutrin  mg daily  -Pt aware of R/B/A and consents to treatment    Return to Clinic: 2 months             [1]   Outpatient Encounter Medications as of 7/14/2025   Medication Sig Dispense Refill    brompheniramine-pseudoeph-DM (BROMFED DM) 2-30-10 mg/5 mL Syrp " Addended by: JON PALOMO on: 8/23/2019 01:31 PM     Modules accepted: Orders     Take 10 mLs by mouth every 6 (six) hours as needed (cough/congestion). (Patient not taking: Reported on 4/3/2025) 150 mL 0    buPROPion (WELLBUTRIN XL) 300 MG 24 hr tablet Take 1 tablet (300 mg total) by mouth once daily. 30 tablet 0     No facility-administered encounter medications on file as of 7/14/2025.

## 2025-08-21 ENCOUNTER — OFFICE VISIT (OUTPATIENT)
Dept: FAMILY MEDICINE | Facility: CLINIC | Age: 25
End: 2025-08-21
Payer: COMMERCIAL

## 2025-08-21 ENCOUNTER — LAB VISIT (OUTPATIENT)
Dept: LAB | Facility: HOSPITAL | Age: 25
End: 2025-08-21
Attending: INTERNAL MEDICINE
Payer: COMMERCIAL

## 2025-08-21 VITALS
BODY MASS INDEX: 26.4 KG/M2 | HEART RATE: 98 BPM | OXYGEN SATURATION: 96 % | SYSTOLIC BLOOD PRESSURE: 124 MMHG | WEIGHT: 205.69 LBS | DIASTOLIC BLOOD PRESSURE: 84 MMHG | HEIGHT: 74 IN

## 2025-08-21 DIAGNOSIS — Z11.3 SCREENING FOR STD (SEXUALLY TRANSMITTED DISEASE): Primary | ICD-10-CM

## 2025-08-21 DIAGNOSIS — Z11.3 SCREENING FOR STD (SEXUALLY TRANSMITTED DISEASE): ICD-10-CM

## 2025-08-21 PROCEDURE — 99999 PR PBB SHADOW E&M-EST. PATIENT-LVL III: CPT | Mod: PBBFAC,,, | Performed by: INTERNAL MEDICINE

## 2025-08-21 PROCEDURE — 1159F MED LIST DOCD IN RCRD: CPT | Mod: CPTII,S$GLB,, | Performed by: INTERNAL MEDICINE

## 2025-08-21 PROCEDURE — 3074F SYST BP LT 130 MM HG: CPT | Mod: CPTII,S$GLB,, | Performed by: INTERNAL MEDICINE

## 2025-08-21 PROCEDURE — 3008F BODY MASS INDEX DOCD: CPT | Mod: CPTII,S$GLB,, | Performed by: INTERNAL MEDICINE

## 2025-08-21 PROCEDURE — 99213 OFFICE O/P EST LOW 20 MIN: CPT | Mod: S$GLB,,, | Performed by: INTERNAL MEDICINE

## 2025-08-21 PROCEDURE — 1160F RVW MEDS BY RX/DR IN RCRD: CPT | Mod: CPTII,S$GLB,, | Performed by: INTERNAL MEDICINE

## 2025-08-21 PROCEDURE — 87340 HEPATITIS B SURFACE AG IA: CPT

## 2025-08-21 PROCEDURE — 36415 COLL VENOUS BLD VENIPUNCTURE: CPT | Mod: PO

## 2025-08-21 PROCEDURE — 87591 N.GONORRHOEAE DNA AMP PROB: CPT

## 2025-08-21 PROCEDURE — 3079F DIAST BP 80-89 MM HG: CPT | Mod: CPTII,S$GLB,, | Performed by: INTERNAL MEDICINE

## 2025-08-21 PROCEDURE — 86705 HEP B CORE ANTIBODY IGM: CPT

## 2025-08-21 PROCEDURE — 87389 HIV-1 AG W/HIV-1&-2 AB AG IA: CPT

## 2025-08-21 PROCEDURE — 86803 HEPATITIS C AB TEST: CPT

## 2025-08-22 LAB
HBV CORE IGM SERPL QL IA: NORMAL
HBV SURFACE AG SERPL QL IA: NORMAL
HCV AB SERPL QL IA: NORMAL
HIV 1+2 AB+HIV1 P24 AG SERPL QL IA: NORMAL

## 2025-08-27 LAB
C TRACH DNA SPEC QL NAA+PROBE: NOT DETECTED
CTGC SOURCE (OHS) ORD-325: NORMAL
N GONORRHOEA DNA UR QL NAA+PROBE: NOT DETECTED